# Patient Record
Sex: MALE | Race: WHITE | URBAN - METROPOLITAN AREA
[De-identification: names, ages, dates, MRNs, and addresses within clinical notes are randomized per-mention and may not be internally consistent; named-entity substitution may affect disease eponyms.]

---

## 2017-12-27 ENCOUNTER — APPOINTMENT (RX ONLY)
Dept: URBAN - METROPOLITAN AREA CLINIC 39 | Facility: CLINIC | Age: 22
Setting detail: DERMATOLOGY
End: 2017-12-27

## 2017-12-27 DIAGNOSIS — Z80.8 FAMILY HISTORY OF MALIGNANT NEOPLASM OF OTHER ORGANS OR SYSTEMS: ICD-10-CM

## 2017-12-27 DIAGNOSIS — L85.3 XEROSIS CUTIS: ICD-10-CM

## 2017-12-27 PROBLEM — L20.84 INTRINSIC (ALLERGIC) ECZEMA: Status: ACTIVE | Noted: 2017-12-27

## 2017-12-27 PROBLEM — L55.1 SUNBURN OF SECOND DEGREE: Status: ACTIVE | Noted: 2017-12-27

## 2017-12-27 PROCEDURE — ? ADDITIONAL NOTES

## 2017-12-27 PROCEDURE — 99201: CPT

## 2017-12-27 PROCEDURE — ? COUNSELING

## 2017-12-27 PROCEDURE — ? TREATMENT REGIMEN

## 2017-12-27 ASSESSMENT — LOCATION ZONE DERM: LOCATION ZONE: TRUNK

## 2017-12-27 ASSESSMENT — LOCATION SIMPLE DESCRIPTION DERM
LOCATION SIMPLE: LEFT BUTTOCK
LOCATION SIMPLE: RIGHT BUTTOCK

## 2017-12-27 ASSESSMENT — SEVERITY ASSESSMENT: SEVERITY: CLEAR

## 2017-12-27 ASSESSMENT — PAIN INTENSITY VAS: HOW INTENSE IS YOUR PAIN 0 BEING NO PAIN, 10 BEING THE MOST SEVERE PAIN POSSIBLE?: NO PAIN

## 2017-12-27 ASSESSMENT — LOCATION DETAILED DESCRIPTION DERM
LOCATION DETAILED: LEFT BUTTOCK
LOCATION DETAILED: RIGHT BUTTOCK

## 2017-12-27 NOTE — PROCEDURE: TREATMENT REGIMEN
Plan: Pt will call phone nurse extension and let us know the name of the cream he was previously prescribed. He may also call at some point and request that we send in a prescription topical steroid. Atrophy and stretch megan risks described
Detail Level: Simple
Otc Regimen: Aveeno Eczema [place] or Cerave moisturizing cream

## 2017-12-27 NOTE — HPI: RASH
How Severe Is Your Rash?: mild
Is This A New Presentation, Or A Follow-Up?: Rash
Additional History: Pt originally went to Urgent Care and they thought that it was fungal and they prescribed oral steroids (x 5 days) and a fungal cream, which did not help. Then, when he came home from school on Thanksgiving break, he went to see his PCP, and was prescribed a cream for the itch. Pt cannot remember the name of the cream, but it did take the itch and the rash away. Rash is completely resolved, pt states that it's just faded.

## 2017-12-27 NOTE — PROCEDURE: ADDITIONAL NOTES
Additional Notes: father (pt does not know what kind of skin cancer his father has had, but states that his father is a patient at our office and that he \"gets lots of stuff cut off.\"